# Patient Record
Sex: FEMALE | URBAN - METROPOLITAN AREA
[De-identification: names, ages, dates, MRNs, and addresses within clinical notes are randomized per-mention and may not be internally consistent; named-entity substitution may affect disease eponyms.]

---

## 2020-06-15 PROBLEM — F07.81 POSTCONCUSSION SYNDROME: Status: ACTIVE | Noted: 2020-06-15

## 2020-06-15 NOTE — PROGRESS NOTES
[facility] with [address]  6/4/2000  Primary Care Provider:  No primary care provider on file.     6/15/2020  Accompanied visit: No     Referred by: Dr. Sho Fernandes [facility] orthoped Brain(2/24/2020)-images were not available to review at the time of the visit  1 or 2 small rounded T2/FLAIR hyperintense foci in the bilateral periventricular and deep subcortical white matter are noted and nonspecific and may be post traumatic, related t intact.  Slight swaying on romberg testing        Problem/s Identified this visit:   Postconcussion syndrome  (primary encounter diagnosis)  Migraine without aura and without status migrainosus, not intractable  Memory difficulties    Discussion plus Diagno

## 2020-06-18 ENCOUNTER — TELEPHONE (OUTPATIENT)
Dept: NEUROLOGY | Facility: CLINIC | Age: 20
End: 2020-06-18

## 2020-06-18 NOTE — TELEPHONE ENCOUNTER
2/24/2020 MRI brain report received. Placed on Isadora's desk for her review. Copy sent to be scanned.
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
## 2020-07-09 NOTE — TELEPHONE ENCOUNTER
Spoke to patient and there were 2 small hyperintensities. Recommended doing follow-up MRI Brain in a year to monitor for stability. Per patient the imitrex has been helping her migraines.  She has scheduled the neuropsychological test and eeg
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
## 2022-07-18 ENCOUNTER — TELEPHONE (OUTPATIENT)
Dept: NEUROLOGY | Facility: CLINIC | Age: 22
End: 2022-07-18

## 2022-07-18 NOTE — TELEPHONE ENCOUNTER
[facility] requesting all records from 11/20/19 to present. Sent to scan stat on 7/18/22.   Sent to scanning on 7/18/22

## 2022-07-29 NOTE — TELEPHONE ENCOUNTER
Received medical records request from [place] [id number] for Cheri Cameron for all medical records.     To be completed by scan stat  Copy to scanning